# Patient Record
Sex: FEMALE | Race: WHITE
[De-identification: names, ages, dates, MRNs, and addresses within clinical notes are randomized per-mention and may not be internally consistent; named-entity substitution may affect disease eponyms.]

---

## 2018-05-01 ENCOUNTER — HOSPITAL ENCOUNTER (OUTPATIENT)
Dept: HOSPITAL 92 - SCSMAMMO | Age: 43
Discharge: HOME | End: 2018-05-01
Attending: PHYSICIAN ASSISTANT
Payer: COMMERCIAL

## 2018-05-01 DIAGNOSIS — Z12.31: Primary | ICD-10-CM

## 2018-05-01 PROCEDURE — 77067 SCR MAMMO BI INCL CAD: CPT

## 2018-05-01 NOTE — MMO
BILATERAL DIGITAL SCREENING MAMMOGRAMS:

 

Date:  05/01/18 

 

This patient's mammogram was interpreted with the assistance of computer-aided detection.  

 

Baseline exam. 

 

FINDINGS:

The breast tissue is heterogeneously dense, which may reduce the sensitivity of mammography. No suspi
cious masses, calcifications, or architectural distortion seen. 

 

IMPRESSION: 

 

BIRADS 1:  Negative

 

Return to annual mammographic screening. 

 

POS: ELOISE

## 2021-12-16 ENCOUNTER — HOSPITAL ENCOUNTER (OUTPATIENT)
Dept: HOSPITAL 92 - CSHMAMMO | Age: 46
Discharge: HOME | End: 2021-12-16
Attending: FAMILY MEDICINE
Payer: COMMERCIAL

## 2021-12-16 DIAGNOSIS — Z12.31: Primary | ICD-10-CM

## 2021-12-16 PROCEDURE — 77067 SCR MAMMO BI INCL CAD: CPT

## 2021-12-16 PROCEDURE — 77063 BREAST TOMOSYNTHESIS BI: CPT
